# Patient Record
Sex: MALE | Race: WHITE | Employment: FULL TIME | URBAN - METROPOLITAN AREA
[De-identification: names, ages, dates, MRNs, and addresses within clinical notes are randomized per-mention and may not be internally consistent; named-entity substitution may affect disease eponyms.]

---

## 2017-01-27 ENCOUNTER — OFFICE VISIT (OUTPATIENT)
Dept: INTERNAL MEDICINE CLINIC | Age: 32
End: 2017-01-27

## 2017-01-27 VITALS
WEIGHT: 156.6 LBS | SYSTOLIC BLOOD PRESSURE: 124 MMHG | HEART RATE: 97 BPM | HEIGHT: 64 IN | DIASTOLIC BLOOD PRESSURE: 60 MMHG | BODY MASS INDEX: 26.73 KG/M2 | OXYGEN SATURATION: 99 % | RESPIRATION RATE: 16 BRPM | TEMPERATURE: 98 F

## 2017-01-27 DIAGNOSIS — K40.91 UNILATERAL RECURRENT INGUINAL HERNIA WITHOUT OBSTRUCTION OR GANGRENE: ICD-10-CM

## 2017-01-27 DIAGNOSIS — L21.0 PITYRIASIS: Primary | ICD-10-CM

## 2017-01-27 RX ORDER — KETOCONAZOLE 20 MG/G
CREAM TOPICAL DAILY
Qty: 30 G | Refills: 0 | Status: SHIPPED | OUTPATIENT
Start: 2017-01-27 | End: 2017-06-13 | Stop reason: ALTCHOICE

## 2017-01-27 NOTE — PATIENT INSTRUCTIONS
Pityriasis Rosea: Care Instructions  Your Care Instructions  Pityriasis rosea is a common skin rash. It usually starts as one scaly, reddish-pink spot on your stomach or back. Days or weeks later, more spots appear. The rash may itch, but it will not spread to other people. No one knows what causes pityriasis rosea. Some doctors believe it is a reaction to a virus. Pityriasis rosea is most common in children and young adults. It lasts 1 to 3 months and then goes away on its own. Medicine can help relieve any itching. Follow-up care is a key part of your treatment and safety. Be sure to make and go to all appointments, and call your doctor if you are having problems. It's also a good idea to know your test results and keep a list of the medicines you take. How can you care for yourself at home? · Use your medicine exactly as prescribed. Call your doctor if you have any problems with your medicine. · Expose your skin to small amounts of sunlight, but avoid sunburn. Sunlight can lessen the rash. · Use a mild soap, such as Dove or Cetaphil, when you wash your skin. · Add a handful of oatmeal (ground to a powder) to your bath. Or you can try an oatmeal bath product, such as Aveeno. Keep the water warm or lukewarm. A hot bath or shower may make the rash more visible and itchy. · Use an over-the-counter 1% hydrocortisone cream for small itchy areas. When should you call for help? Call your doctor now or seek immediate medical care if:  · Your whole body itches, but you have no obvious rash or other cause. · Your itching is so bad that you cannot sleep. · Your home treatment does not help. · Your skin is badly broken from scratching. · You have signs of infection such as:  ¨ Pain, warmth, or swelling near the rash. ¨ Red streaks near the rash. ¨ Pus coming from the rash. ¨ A fever. · You see the rash on the palms of your hands or the soles of your feet.   Watch closely for changes in your health, and be sure to contact your doctor if:  · You do not get better as expected. Where can you learn more? Go to http://martínez-ilda.info/. Enter S327 in the search box to learn more about \"Pityriasis Rosea: Care Instructions. \"  Current as of: February 5, 2016  Content Version: 11.1  © 2006-2016 [employer]. Care instructions adapted under license by [employer] (which disclaims liability or warranty for this information). If you have questions about a medical condition or this instruction, always ask your healthcare professional. [address] any warranty or liability for your use of this information. Hernia: Care Instructions  Your Care Instructions    A hernia develops when tissue bulges through a weak spot in the wall of your belly. The groin area and the navel are common areas for a hernia. A hernia can also develop near the area of a surgery you had before. Pressure from lifting, straining, or coughing can tear the weak area, causing the hernia to bulge and be painful. If you cannot push a hernia back into place, the tissue may become trapped outside the belly wall. If the hernia gets twisted and loses its blood supply, it will swell and die. This is called a strangulated hernia. It usually causes a lot of pain. It needs treatment right away. Some hernias need to be repaired to prevent a strangulated hernia. If your hernia causes symptoms or is large, you may need surgery. Follow-up care is a key part of your treatment and safety. Be sure to make and go to all appointments, and call your doctor if you are having problems. Its also a good idea to know your test results and keep a list of the medicines you take. How can you care for yourself at home? · Take care when lifting heavy objects. · Stay at a healthy weight. · Do not smoke. Smoking can cause coughing, which can cause your hernia to bulge.  If you need help quitting, talk to your doctor about stop-smoking programs and medicines. These can increase your chances of quitting for good. · Talk with your doctor before wearing a corset or truss for a hernia. These devices are not recommended for treating hernias and sometimes can do more harm than good. There may be certain situations when your doctor thinks a truss would work, but these are rare. When should you call for help? Call your doctor now or seek immediate medical care if:  · You have severe belly pain. · You have nausea or vomiting. · You have belly pain and are not passing gas or stool. · You cannot push the hernia back into place with gentle pressure when you are lying down. · The skin over the hernia turns red or becomes tender. Watch closely for changes in your health, and be sure to contact your doctor if:  · You have new or increased pain. · You do not get better as expected. Where can you learn more? Go to http://martínez-ilda.info/. Enter C129 in the search box to learn more about \"Hernia: Care Instructions. \"  Current as of: August 9, 2016  Content Version: 11.1  © 1853-3354 [employer]. Care instructions adapted under license by [employer] (which disclaims liability or warranty for this information). If you have questions about a medical condition or this instruction, always ask your healthcare professional. [address] any warranty or liability for your use of this information.

## 2017-01-27 NOTE — PROGRESS NOTES
Misael Santana is a 27 y.o. male who was seen in clinic today (1/27/2017). He was last seen about 2 years. Assessment & Plan:  Oxana Wiseman was seen today for skin problem and groin swelling. Diagnoses and all orders for this visit:    Pityriasis- this is a chronic problem but new to me, differential dx reviewed with the patient, looks fungal for the most part but not entirely sure. Will treat with meds below. If no changes then to dermatology. Red flags were reviewed with the patient to RTC or notify me, expected time course for resolution reviewed. -     ketoconazole (NIZORAL) 2 % topical cream; Apply  to affected area daily. Unilateral recurrent inguinal hernia without obstruction or gangrene- differential dx reviewed with the patient, sounds classic. Asymptomatic. Not effecting his life. Likely more noticeable due to weight gain. Will treat monitor for now. Red flags were reviewed with the patient to RTC or notify me, did review f/u with surgeon if no changes or gets worse. Follow-up Disposition:  Return in about 6 months (around 7/27/2017) for FULL PHYSICAL - 30 minutes. ----------------------------------------------------------------------    Subjective:  Dermatology Review  He is here to talk about rash. He noticed it 1 month ago, with no changes since that time. Location: back. Symptoms include erythema and itching. He denies: recent travel, new medications, changed in soaps/detergents, change in diet, new pets. Treatment to date has included steroids. He was seen at [facility], treated w/ prednisone, and had no effect. GI Review  Patient complains of mass in his L groin. This has been present for a few weeks ago, unchanged since that time. His symptoms include a mass. It is changing in sides. No h/o trauma. He denies any fevers or chills. He is working out regularly w/o any issues.            Prior to Admission medications    Not on File          Allergies Allergen Reactions    Bactrim [Sulfamethoxazole-Trimethoprim] Hives           Review of Systems   Constitutional: Positive for weight loss. Negative for malaise/fatigue. Respiratory: Negative for cough and shortness of breath. Cardiovascular: Negative for chest pain and palpitations. Gastrointestinal: Positive for abdominal pain. Negative for constipation, diarrhea, nausea and vomiting. Musculoskeletal: Negative for joint pain and myalgias. Skin: Positive for rash. Negative for itching. Objective:   Physical Exam   Cardiovascular: Regular rhythm and normal heart sounds. No murmur heard. Pulmonary/Chest: Effort normal and breath sounds normal. He has no wheezes. He has no rales. Abdominal: Soft. Bowel sounds are normal. He exhibits mass. There is no hepatosplenomegaly. There is no tenderness. Lymphadenopathy:     He has no cervical adenopathy. Skin:   Upper back, bilat shoulders, and upper chest there is scattered erythematous papules. On the whole back there is a lacy faint erythematous flat rash          Visit Vitals    /60 (BP 1 Location: Right arm, BP Patient Position: Sitting)    Pulse 97    Temp 98 °F (36.7 °C) (Oral)    Resp 16    Ht 5' 4\" (1.626 m)    Wt 156 lb 9.6 oz (71 kg)    SpO2 99%    BMI 26.88 kg/m2         Disclaimer:  Advised him to call back or return to office if symptoms worsen/change/persist.  Discussed expected course/resolution/complications of diagnosis in detail with patient. Medication risks/benefits/costs/interactions/alternatives discussed with patient. He was given an after visit summary which includes diagnoses, current medications, & vitals. He expressed understanding with the diagnosis and plan.         Gio Denney MD

## 2017-01-27 NOTE — MR AVS SNAPSHOT
Visit Information Date & Time Provider Department Dept. Phone Encounter #  
 1/27/2017  8:15 AM Prince Silverman, [facility] [phone] [id number] Follow-up Instructions Return in about 6 months (around 7/27/2017) for FULL PHYSICAL - 30 minutes. Upcoming Health Maintenance Date Due INFLUENZA AGE 9 TO ADULT 8/1/2016 DTaP/Tdap/Td series (2 - Td) 11/3/2024 Allergies as of 1/27/2017  Review Complete On: 1/27/2017 By: Prince Herrera MD  
  
 Severity Noted Reaction Type Reactions Bactrim [Sulfamethoxazole-trimethoprim]  06/26/2013    Hives Current Immunizations  Reviewed on 1/27/2017 Name Date Influenza Vaccine 11/1/2016, 11/1/2013 Tdap 11/3/2014 Reviewed by Prince Herrera MD on 1/27/2017 at  8:50 AM  
You Were Diagnosed With   
  
 Codes Comments Pityriasis    -  Primary ICD-10-CM: L21.0 ICD-9-CM: 696.5 Unilateral recurrent inguinal hernia without obstruction or gangrene     ICD-10-CM: K40.91 
ICD-9-CM: 550.91 Vitals BP Pulse Temp Resp Height(growth percentile) Weight(growth percentile) 124/60 (BP 1 Location: Right arm, BP Patient Position: Sitting) 97 98 °F (36.7 °C) (Oral) 16 5' 4\" (1.626 m) 156 lb 9.6 oz (71 kg) SpO2 BMI Smoking Status 99% 26.88 kg/m2 Never Smoker BMI and BSA Data Body Mass Index Body Surface Area  
 26.88 kg/m 2 1.79 m 2 Preferred Pharmacy Pharmacy Name Phone [id number]- [address], [address] [phone] Your Updated Medication List  
  
Notice  As of 1/27/2017  8:52 AM  
 You have not been prescribed any medications. Follow-up Instructions Return in about 6 months (around 7/27/2017) for FULL PHYSICAL - 30 minutes. Patient Instructions Pityriasis Rosea: Care Instructions Your Care Instructions Pityriasis rosea is a common skin rash. It usually starts as one scaly, reddish-pink spot on your stomach or back. Days or weeks later, more spots appear. The rash may itch, but it will not spread to other people. No one knows what causes pityriasis rosea. Some doctors believe it is a reaction to a virus. Pityriasis rosea is most common in children and young adults. It lasts 1 to 3 months and then goes away on its own. Medicine can help relieve any itching. Follow-up care is a key part of your treatment and safety. Be sure to make and go to all appointments, and call your doctor if you are having problems. It's also a good idea to know your test results and keep a list of the medicines you take. How can you care for yourself at home? · Use your medicine exactly as prescribed. Call your doctor if you have any problems with your medicine. · Expose your skin to small amounts of sunlight, but avoid sunburn. Sunlight can lessen the rash. · Use a mild soap, such as Dove or Cetaphil, when you wash your skin. · Add a handful of oatmeal (ground to a powder) to your bath. Or you can try an oatmeal bath product, such as Aveeno. Keep the water warm or lukewarm. A hot bath or shower may make the rash more visible and itchy. · Use an over-the-counter 1% hydrocortisone cream for small itchy areas. When should you call for help? Call your doctor now or seek immediate medical care if: 
· Your whole body itches, but you have no obvious rash or other cause. · Your itching is so bad that you cannot sleep. · Your home treatment does not help. · Your skin is badly broken from scratching. · You have signs of infection such as: 
¨ Pain, warmth, or swelling near the rash. ¨ Red streaks near the rash. ¨ Pus coming from the rash. ¨ A fever. · You see the rash on the palms of your hands or the soles of your feet. Watch closely for changes in your health, and be sure to contact your doctor if: · You do not get better as expected. Where can you learn more? Go to http://martínez-ilda.info/. Enter S327 in the search box to learn more about \"Pityriasis Rosea: Care Instructions. \" Current as of: February 5, 2016 Content Version: 11.1 © 2803-7166 [employer]. Care instructions adapted under license by [employer] (which disclaims liability or warranty for this information). If you have questions about a medical condition or this instruction, always ask your healthcare professional. [address] any warranty or liability for your use of this information. Hernia: Care Instructions Your Care Instructions A hernia develops when tissue bulges through a weak spot in the wall of your belly. The groin area and the navel are common areas for a hernia. A hernia can also develop near the area of a surgery you had before. Pressure from lifting, straining, or coughing can tear the weak area, causing the hernia to bulge and be painful. If you cannot push a hernia back into place, the tissue may become trapped outside the belly wall. If the hernia gets twisted and loses its blood supply, it will swell and die. This is called a strangulated hernia. It usually causes a lot of pain. It needs treatment right away. Some hernias need to be repaired to prevent a strangulated hernia. If your hernia causes symptoms or is large, you may need surgery. Follow-up care is a key part of your treatment and safety. Be sure to make and go to all appointments, and call your doctor if you are having problems. Its also a good idea to know your test results and keep a list of the medicines you take. How can you care for yourself at home? · Take care when lifting heavy objects. · Stay at a healthy weight. · Do not smoke. Smoking can cause coughing, which can cause your hernia to bulge.  If you need help quitting, talk to your doctor about stop-smoking programs and medicines. These can increase your chances of quitting for good. · Talk with your doctor before wearing a corset or truss for a hernia. These devices are not recommended for treating hernias and sometimes can do more harm than good. There may be certain situations when your doctor thinks a truss would work, but these are rare. When should you call for help? Call your doctor now or seek immediate medical care if: 
· You have severe belly pain. · You have nausea or vomiting. · You have belly pain and are not passing gas or stool. · You cannot push the hernia back into place with gentle pressure when you are lying down. · The skin over the hernia turns red or becomes tender. Watch closely for changes in your health, and be sure to contact your doctor if: 
· You have new or increased pain. · You do not get better as expected. Where can you learn more? Go to http://martínez-ilda.info/. Enter C129 in the search box to learn more about \"Hernia: Care Instructions. \" Current as of: August 9, 2016 Content Version: 11.1 © 8334-3426 [employer]. Care instructions adapted under license by [employer] (which disclaims liability or warranty for this information). If you have questions about a medical condition or this instruction, always ask your healthcare professional. [address] any warranty or liability for your use of this information. Introducing [facility]! Ethel Davis introduces [employer] patient portal. Now you can access parts of your medical record, email your doctor's office, and request medication refills online. 1. In your internet browser, go to [email or web address] 2. Click on the First Time User? Click Here link in the Sign In box. You will see the New Member Sign Up page. 3. Enter your [employer] Access Code exactly as it appears below.  You will not need to use this code after youve completed the sign-up process. If you do not sign up before the expiration date, you must request a new code. · [employer] Access Code: [email or web address] Expires: 4/27/2017  8:52 AM 
 
4. Enter the last four digits of your Social Security Number (xxxx) and Date of Birth (mm/dd/yyyy) as indicated and click Submit. You will be taken to the next sign-up page. 5. Create a [employer] ID. This will be your [employer] login ID and cannot be changed, so think of one that is secure and easy to remember. 6. Create a [employer] password. You can change your password at any time. 7. Enter your Password Reset Question and Answer. This can be used at a later time if you forget your password. 8. Enter your e-mail address. You will receive e-mail notification when new information is available in [address] 9. Click Sign Up. You can now view and download portions of your medical record. 10. Click the Download Summary menu link to download a portable copy of your medical information. If you have questions, please visit the Frequently Asked Questions section of the [employer] website. Remember, [employer] is NOT to be used for urgent needs. For medical emergencies, dial 911. Now available from your iPhone and Android! Please provide this summary of care documentation to your next provider. Your primary care clinician is listed as Graciela Spaulding. If you have any questions after today's visit, please call [phone].

## 2017-02-13 ENCOUNTER — TELEPHONE (OUTPATIENT)
Dept: INTERNAL MEDICINE CLINIC | Age: 32
End: 2017-02-13

## 2017-02-13 RX ORDER — TRIAMCINOLONE ACETONIDE 1 MG/G
CREAM TOPICAL 2 TIMES DAILY
Qty: 15 G | Refills: 0 | Status: SHIPPED | OUTPATIENT
Start: 2017-02-13 | End: 2017-06-13 | Stop reason: ALTCHOICE

## 2017-02-13 NOTE — TELEPHONE ENCOUNTER
----- Message from Tanesha Handley LPN sent at 2/45/7752  1:40 PM EST -----  Regarding: FW: cph          rash       ----- Message -----     From: Travis Calderon     Sent: 2/13/2017   7:38 AM       To: Yaneli Zamora Team Two Pool  Subject: cph          rash                                Karime Monique  Male, 27 y.o., 1985  Weight:   156 lb 9.6 oz (71 kg)  Phone:   [phone]; [phone]  PCP:   Sandra Yeung MD  MRN:   [id number]  MyChart:   Pending  Next Appt:   06/13/2017    Dr Chirinos/telephone  Received:  Today      Sweetie Wilkinson Gaby Front Office Pool                 Pt (p) [phone], pt said he was seen on Jan 27,2017 for a rash and was prescribed Ketoconazole    And  Was told that if it did not clear up to call back , he said it did not spread but it did not clear up also, so he would like to know what will be the next step.

## 2017-06-13 ENCOUNTER — OFFICE VISIT (OUTPATIENT)
Dept: INTERNAL MEDICINE CLINIC | Age: 32
End: 2017-06-13

## 2017-06-13 VITALS
SYSTOLIC BLOOD PRESSURE: 110 MMHG | WEIGHT: 163.6 LBS | BODY MASS INDEX: 27.26 KG/M2 | DIASTOLIC BLOOD PRESSURE: 76 MMHG | TEMPERATURE: 98.1 F | HEIGHT: 65 IN | OXYGEN SATURATION: 98 % | RESPIRATION RATE: 12 BRPM | HEART RATE: 82 BPM

## 2017-06-13 DIAGNOSIS — Z00.00 ROUTINE PHYSICAL EXAMINATION: Primary | ICD-10-CM

## 2017-06-13 DIAGNOSIS — E66.3 OVERWEIGHT (BMI 25.0-29.9): ICD-10-CM

## 2017-06-13 NOTE — PROGRESS NOTES
Xuan Hager is a 27 y.o. male who was seen in clinic today (6/13/2017) for a physical.  Patient was seen with Dr Abram Ceron (R2 at [facility]). Assessment & Plan:   Diagnoses and all orders for this visit:    1. Routine physical examination  -     METABOLIC PANEL, COMPREHENSIVE  -     CBC W/O DIFF  -     LIPID PANEL    2. Overweight (BMI 25.0-29.9)- fluctuating, I have reviewed/discussed the above normal BMI with the patient. I have recommended the following interventions: encourage exercise and lifestyle education regarding diet. He knows what he needs to do, reviewed ways to stay in good habits. Follow-up Disposition:  Return in about 2 years (around 6/13/2019) for FULL PHYSICAL - 30 minutes. ------------------------------------------------------------------------------------------    Subjective:   Ruby Khan is here today for a full physical.      Health Maintenance  Immunizations:    Influenza: up to date. Tetanus: up to date. Cancer screening:    Reviewed testicular, prostate, and colon cancer screening guidelines. Patient Care Team:  Estrella Kidd MD as PCP - General (Internal Medicine)       The following sections were reviewed & updated as appropriate: PMH, PSH, FH, and SH. Patient Active Problem List   Diagnosis Code    Diverticulitis K57.92          Prior to Admission medications    Not on File          Allergies   Allergen Reactions    Bactrim [Sulfamethoxazole-Trimethoprim] Hives            Review of Systems   Constitutional: Negative for chills and fever. Respiratory: Negative for cough and shortness of breath. Cardiovascular: Negative for chest pain and palpitations. Gastrointestinal: Negative for abdominal pain, blood in stool, constipation, diarrhea, heartburn, nausea and vomiting. Hernia is fluctuating, little more prevalent w/ weight gain. No pain   Musculoskeletal: Negative for joint pain and myalgias.    Neurological: Negative for tingling, focal weakness and headaches. Endo/Heme/Allergies: Does not bruise/bleed easily. Psychiatric/Behavioral: Negative for depression. The patient is not nervous/anxious and does not have insomnia. Objective:   Physical Exam   Constitutional: He appears well-developed. No distress. HENT:   Right Ear: Tympanic membrane, external ear and ear canal normal.   Left Ear: Tympanic membrane, external ear and ear canal normal.   Nose: Nose normal.   Mouth/Throat: Uvula is midline, oropharynx is clear and moist and mucous membranes are normal. No posterior oropharyngeal erythema. Eyes: Conjunctivae and lids are normal. No scleral icterus. Neck: Neck supple. Carotid bruit is not present. No thyromegaly present. Cardiovascular: Regular rhythm and normal heart sounds. No murmur heard. Pulses:       Dorsalis pedis pulses are 2+ on the right side, and 2+ on the left side. Posterior tibial pulses are 2+ on the right side, and 2+ on the left side. Pulmonary/Chest: Effort normal and breath sounds normal. He has no wheezes. He has no rales. Abdominal: Soft. Bowel sounds are normal. He exhibits no mass. There is no hepatosplenomegaly. There is no tenderness. A hernia is present. Hernia confirmed positive in the left inguinal area (4cm, easily reducible). Musculoskeletal: He exhibits no edema. Cervical back: Normal.        Thoracic back: He exhibits no bony tenderness. Lumbar back: Normal.   Lymphadenopathy:     He has no cervical adenopathy. Neurological: He has normal strength. No sensory deficit. Skin: No rash noted. Psychiatric: He has a normal mood and affect.  His behavior is normal.          Visit Vitals    /76    Pulse 82    Temp 98.1 °F (36.7 °C) (Oral)    Resp 12    Ht 5' 4.6\" (1.641 m)    Wt 163 lb 9.6 oz (74.2 kg)    SpO2 98%    BMI 27.56 kg/m2          Advised him to call back or return to office if symptoms worsen/change/persist.  Discussed expected course/resolution/complications of diagnosis in detail with patient. Medication risks/benefits/costs/interactions/alternatives discussed with patient. He was given an after visit summary which includes diagnoses, current medications, & vitals. He expressed understanding with the diagnosis and plan.         Opal Landis MD

## 2017-06-13 NOTE — MR AVS SNAPSHOT
Visit Information Date & Time Provider Department Dept. Phone Encounter #  
 6/13/2017  9:30 AM [address] [place], [address] [facility] Internal Medicine [phone] [id number] Follow-up Instructions Return in about 2 years (around 6/13/2019) for FULL PHYSICAL - 30 minutes. Upcoming Health Maintenance Date Due INFLUENZA AGE 9 TO ADULT 8/1/2017 DTaP/Tdap/Td series (2 - Td) 11/3/2024 Allergies as of 6/13/2017  Review Complete On: 6/13/2017 By: Rd Newton MD  
  
 Severity Noted Reaction Type Reactions Bactrim [Sulfamethoxazole-trimethoprim]  06/26/2013    Hives Current Immunizations  Reviewed on 6/13/2017 Name Date Influenza Vaccine 11/1/2016, 11/1/2013 Tdap 11/3/2014 Reviewed by Danya Person RN on 6/13/2017 at  9:35 AM  
You Were Diagnosed With   
  
 Codes Comments Routine physical examination    -  Primary ICD-10-CM: Z00.00 ICD-9-CM: V70.0 Vitals BP Pulse Temp Resp Height(growth percentile) Weight(growth percentile) 110/76 82 98.1 °F (36.7 °C) (Oral) 12 5' 4.6\" (1.641 m) 163 lb 9.6 oz (74.2 kg) SpO2 BMI Smoking Status 98% 27.56 kg/m2 Never Smoker Vitals History BMI and BSA Data Body Mass Index Body Surface Area  
 27.56 kg/m 2 1.84 m 2 Preferred Pharmacy Pharmacy Name Phone [facility] #[id number]- [facility], [address] [place] [address] [phone] Your Updated Medication List  
  
Notice  As of 6/13/2017  9:54 AM  
 You have not been prescribed any medications. We Performed the Following CBC W/O DIFF [88033 CPT(R)] LIPID PANEL [98954 CPT(R)] METABOLIC PANEL, COMPREHENSIVE [50487 CPT(R)] Follow-up Instructions Return in about 2 years (around 6/13/2019) for FULL PHYSICAL - 30 minutes. Patient Instructions Well Visit, Ages 25 to 48: Care Instructions Your Care Instructions Physical exams can help you stay healthy. Your doctor has checked your overall health and may have suggested ways to take good care of yourself. He or she also may have recommended tests. At home, you can help prevent illness with healthy eating, regular exercise, and other steps. Follow-up care is a key part of your treatment and safety. Be sure to make and go to all appointments, and call your doctor if you are having problems. It's also a good idea to know your test results and keep a list of the medicines you take. How can you care for yourself at home? · Reach and stay at a healthy weight. This will lower your risk for many problems, such as obesity, diabetes, heart disease, and high blood pressure. · Get at least 30 minutes of physical activity on most days of the week. Walking is a good choice. You also may want to do other activities, such as running, swimming, cycling, or playing tennis or team sports. Discuss any changes in your exercise program with your doctor. · Do not smoke or allow others to smoke around you. If you need help quitting, talk to your doctor about stop-smoking programs and medicines. These can increase your chances of quitting for good. · Talk to your doctor about whether you have any risk factors for sexually transmitted infections (STIs). Having one sex partner (who does not have STIs and does not have sex with anyone else) is a good way to avoid these infections. · Use birth control if you do not want to have children at this time. Talk with your doctor about the choices available and what might be best for you. · Protect your skin from too much sun. When you're outdoors from 10 a.m. to 4 p.m., stay in the shade or cover up with clothing and a hat with a wide brim. Wear sunglasses that block UV rays. Even when it's cloudy, put broad-spectrum sunscreen (SPF 30 or higher) on any exposed skin. · See a dentist one or two times a year for checkups and to have your teeth cleaned. · Wear a seat belt in the car. · Drink alcohol in moderation, if at all. That means no more than 2 drinks a day for men and 1 drink a day for women. Follow your doctor's advice about when to have certain tests. These tests can spot problems early. For everyone · Cholesterol. Have the fat (cholesterol) in your blood tested after age 21. Your doctor will tell you how often to have this done based on your age, family history, or other things that can increase your risk for heart disease. · Blood pressure. Have your blood pressure checked during a routine doctor visit. Your doctor will tell you how often to check your blood pressure based on your age, your blood pressure results, and other factors. · Vision. Talk with your doctor about how often to have a glaucoma test. 
· Diabetes. Ask your doctor whether you should have tests for diabetes. · Colon cancer. Have a test for colon cancer at age 48. You may have one of several tests. If you are younger than 48, you may need a test earlier if you have any risk factors. Risk factors include whether you already had a precancerous polyp removed from your colon or whether your parent, brother, sister, or child has had colon cancer. For women · Breast exam and mammogram. Talk to your doctor about when you should have a clinical breast exam and a mammogram. Medical experts differ on whether and how often women under 50 should have these tests. Your doctor can help you decide what is right for you. · Pap test and pelvic exam. Begin Pap tests at age 24. A Pap test is the best way to find cervical cancer. The test often is part of a pelvic exam. Ask how often to have this test. 
· Tests for sexually transmitted infections (STIs). Ask whether you should have tests for STIs. You may be at risk if you have sex with more than one person, especially if your partners do not wear condoms. For men · Tests for sexually transmitted infections (STIs).  Ask whether you should have tests for STIs. You may be at risk if you have sex with more than one person, especially if you do not wear a condom. · Testicular cancer exam. Ask your doctor whether you should check your testicles regularly. · Prostate exam. Talk to your doctor about whether you should have a blood test (called a PSA test) for prostate cancer. Experts differ on whether and when men should have this test. Some experts suggest it if you are older than 39 and are -American or have a father or brother who got prostate cancer when he was younger than 72. When should you call for help? Watch closely for changes in your health, and be sure to contact your doctor if you have any problems or symptoms that concern you. Where can you learn more? Go to http://martínez-ilda.info/. Enter P072 in the search box to learn more about \"Well Visit, Ages 25 to 48: Care Instructions. \" Current as of: July 19, 2016 Content Version: 11.2 © 8136-2495 [employer]. Care instructions adapted under license by [employer] (which disclaims liability or warranty for this information). If you have questions about a medical condition or this instruction, always ask your healthcare professional. [place] [address] any warranty or liability for your use of this information. Introducing hospitals & HEALTH SERVICES! Torie Soto introduces [employer] patient portal. Now you can access parts of your medical record, email your doctor's office, and request medication refills online. 1. In your internet browser, go to [email or web address] 2. Click on the First Time User? Click Here link in the Sign In box. You will see the New Member Sign Up page. 3. Enter your [employer] Access Code exactly as it appears below. You will not need to use this code after youve completed the sign-up process. If you do not sign up before the expiration date, you must request a new code. · [employer] Access Code: [email or web address] Expires: 9/11/2017  9:04 AM 
 
4. Enter the last four digits of your Social Security Number (xxxx) and Date of Birth (mm/dd/yyyy) as indicated and click Submit. You will be taken to the next sign-up page. 5. Create a [employer] ID. This will be your [employer] login ID and cannot be changed, so think of one that is secure and easy to remember. 6. Create a [employer] password. You can change your password at any time. 7. Enter your Password Reset Question and Answer. This can be used at a later time if you forget your password. 8. Enter your e-mail address. You will receive e-mail notification when new information is available in [address] 9. Click Sign Up. You can now view and download portions of your medical record. 10. Click the Download Summary menu link to download a portable copy of your medical information. If you have questions, please visit the Frequently Asked Questions section of the [employer] website. Remember, [employer] is NOT to be used for urgent needs. For medical emergencies, dial 911. Now available from your iPhone and Android! Please provide this summary of care documentation to your next provider. Your primary care clinician is listed as Scar Malagon. If you have any questions after today's visit, please call [phone].

## 2017-06-14 LAB
ALBUMIN SERPL-MCNC: 5.1 G/DL (ref 3.5–5.5)
ALBUMIN/GLOB SERPL: 2.4 {RATIO} (ref 1.2–2.2)
ALP SERPL-CCNC: 99 IU/L (ref 39–117)
ALT SERPL-CCNC: 21 IU/L (ref 0–44)
AST SERPL-CCNC: 23 IU/L (ref 0–40)
BILIRUB SERPL-MCNC: 0.4 MG/DL (ref 0–1.2)
BUN SERPL-MCNC: 16 MG/DL (ref 6–20)
BUN/CREAT SERPL: 15 (ref 9–20)
CALCIUM SERPL-MCNC: 9.6 MG/DL (ref 8.7–10.2)
CHLORIDE SERPL-SCNC: 98 MMOL/L (ref 96–106)
CHOLEST SERPL-MCNC: 168 MG/DL (ref 100–199)
CO2 SERPL-SCNC: 23 MMOL/L (ref 18–29)
CREAT SERPL-MCNC: 1.1 MG/DL (ref 0.76–1.27)
ERYTHROCYTE [DISTWIDTH] IN BLOOD BY AUTOMATED COUNT: 13.9 % (ref 12.3–15.4)
GLOBULIN SER CALC-MCNC: 2.1 G/DL (ref 1.5–4.5)
GLUCOSE SERPL-MCNC: 90 MG/DL (ref 65–99)
HCT VFR BLD AUTO: 47.3 % (ref 37.5–51)
HDLC SERPL-MCNC: 54 MG/DL
HGB BLD-MCNC: 16.3 G/DL (ref 12.6–17.7)
LDLC SERPL CALC-MCNC: 105 MG/DL (ref 0–99)
MCH RBC QN AUTO: 31.3 PG (ref 26.6–33)
MCHC RBC AUTO-ENTMCNC: 34.5 G/DL (ref 31.5–35.7)
MCV RBC AUTO: 91 FL (ref 79–97)
PLATELET # BLD AUTO: 261 X10E3/UL (ref 150–379)
POTASSIUM SERPL-SCNC: 4.6 MMOL/L (ref 3.5–5.2)
PROT SERPL-MCNC: 7.2 G/DL (ref 6–8.5)
RBC # BLD AUTO: 5.2 X10E6/UL (ref 4.14–5.8)
SODIUM SERPL-SCNC: 140 MMOL/L (ref 134–144)
TRIGL SERPL-MCNC: 43 MG/DL (ref 0–149)
VLDLC SERPL CALC-MCNC: 9 MG/DL (ref 5–40)
WBC # BLD AUTO: 7.5 X10E3/UL (ref 3.4–10.8)

## 2020-03-17 ENCOUNTER — OFFICE VISIT (OUTPATIENT)
Dept: INTERNAL MEDICINE CLINIC | Age: 35
End: 2020-03-17

## 2020-03-17 VITALS
BODY MASS INDEX: 29.16 KG/M2 | DIASTOLIC BLOOD PRESSURE: 72 MMHG | SYSTOLIC BLOOD PRESSURE: 100 MMHG | OXYGEN SATURATION: 99 % | WEIGHT: 175 LBS | RESPIRATION RATE: 16 BRPM | HEART RATE: 78 BPM | HEIGHT: 65 IN | TEMPERATURE: 97.5 F

## 2020-03-17 DIAGNOSIS — Z90.49 HISTORY OF PARTIAL COLECTOMY: ICD-10-CM

## 2020-03-17 DIAGNOSIS — Z00.00 ROUTINE PHYSICAL EXAMINATION: Primary | ICD-10-CM

## 2020-03-17 DIAGNOSIS — E66.3 OVERWEIGHT (BMI 25.0-29.9): ICD-10-CM

## 2020-03-17 DIAGNOSIS — R41.840 CONCENTRATION DEFICIT: ICD-10-CM

## 2020-03-17 RX ORDER — IBUPROFEN 200 MG
200 TABLET ORAL
COMMUNITY

## 2020-03-17 NOTE — PROGRESS NOTES
GEORGETTE.  Returned from [facility] on 03/02/20. May be having knee surgery in the future.

## 2020-03-17 NOTE — PATIENT INSTRUCTIONS
Well Visit, Ages 25 to 48: Care Instructions Your Care Instructions Physical exams can help you stay healthy. Your doctor has checked your overall health and may have suggested ways to take good care of yourself. He or she also may have recommended tests. At home, you can help prevent illness with healthy eating, regular exercise, and other steps. Follow-up care is a key part of your treatment and safety. Be sure to make and go to all appointments, and call your doctor if you are having problems. It's also a good idea to know your test results and keep a list of the medicines you take. How can you care for yourself at home? · Reach and stay at a healthy weight. This will lower your risk for many problems, such as obesity, diabetes, heart disease, and high blood pressure. · Get at least 30 minutes of physical activity on most days of the week. Walking is a good choice. You also may want to do other activities, such as running, swimming, cycling, or playing tennis or team sports. Discuss any changes in your exercise program with your doctor. · Do not smoke or allow others to smoke around you. If you need help quitting, talk to your doctor about stop-smoking programs and medicines. These can increase your chances of quitting for good. · Talk to your doctor about whether you have any risk factors for sexually transmitted infections (STIs). Having one sex partner (who does not have STIs and does not have sex with anyone else) is a good way to avoid these infections. · Use birth control if you do not want to have children at this time. Talk with your doctor about the choices available and what might be best for you. · Protect your skin from too much sun. When you're outdoors from 10 a.m. to 4 p.m., stay in the shade or cover up with clothing and a hat with a wide brim. Wear sunglasses that block UV rays. Even when it's cloudy, put broad-spectrum sunscreen (SPF 30 or higher) on any exposed skin. · See a dentist one or two times a year for checkups and to have your teeth cleaned. · Wear a seat belt in the car. Follow your doctor's advice about when to have certain tests. These tests can spot problems early. For everyone · Cholesterol. Have the fat (cholesterol) in your blood tested after age 21. Your doctor will tell you how often to have this done based on your age, family history, or other things that can increase your risk for heart disease. · Blood pressure. Have your blood pressure checked during a routine doctor visit. Your doctor will tell you how often to check your blood pressure based on your age, your blood pressure results, and other factors. · Vision. Talk with your doctor about how often to have a glaucoma test. 
· Diabetes. Ask your doctor whether you should have tests for diabetes. · Colon cancer. Your risk for colorectal cancer gets higher as you get older. Some experts say that adults should start regular screening at age 48 and stop at age 76. Others say to start before age 48 or continue after age 76. Talk with your doctor about your risk and when to start and stop screening. For women · Breast exam and mammogram. Talk to your doctor about when you should have a clinical breast exam and a mammogram. Medical experts differ on whether and how often women under 50 should have these tests. Your doctor can help you decide what is right for you. · Cervical cancer screening test and pelvic exam. Begin with a Pap test at age 24. The test often is part of a pelvic exam. Starting at age 27, you may choose to have a Pap test, an HPV test, or both tests at the same time (called co-testing). Talk with your doctor about how often to have testing. · Tests for sexually transmitted infections (STIs). Ask whether you should have tests for STIs. You may be at risk if you have sex with more than one person, especially if your partners do not wear condoms. For men · Tests for sexually transmitted infections (STIs). Ask whether you should have tests for STIs. You may be at risk if you have sex with more than one person, especially if you do not wear a condom. · Testicular cancer exam. Ask your doctor whether you should check your testicles regularly. · Prostate exam. Talk to your doctor about whether you should have a blood test (called a PSA test) for prostate cancer. Experts differ on whether and when men should have this test. Some experts suggest it if you are older than 39 and are -American or have a father or brother who got prostate cancer when he was younger than 72. When should you call for help? Watch closely for changes in your health, and be sure to contact your doctor if you have any problems or symptoms that concern you. Where can you learn more? Go to http://martínez-ilda.info/ Enter P072 in the search box to learn more about \"Well Visit, Ages 25 to 48: Care Instructions. \" Current as of: August 21, 2019Content Version: 12.4 © 3751-3401 [employer]. Care instructions adapted under license by [employer] (which disclaims liability or warranty for this information). If you have questions about a medical condition or this instruction, always ask your healthcare professional. [address] any warranty or liability for your use of this information.

## 2020-03-17 NOTE — PROGRESS NOTES
Tomeka Navarrete is a 38 y.o. male who was seen in clinic today (3/17/2020) for a physical.          Assessment & Plan:   Diagnoses and all orders for this visit:    1. Routine physical examination  -     METABOLIC PANEL, COMPREHENSIVE  -     CBC W/O DIFF  -     LIPID PANEL    2. History of partial colectomy    3. Concentration deficit- new dx to me, he reports chronic issue, reviewed pros/cons to testing, reviewed pros/cons to medications if he has ADHD  -     REFERRAL TO PSYCHOLOGY    4. Overweight (BMI 25.0-29. 9)- poorly controlled, worsening, I have recommended the following interventions: encourage exercise and lifestyle education regarding diet. Follow-up and Dispositions    · Return in about 1 year (around 3/17/2021) for FULL PHYSICAL - 30 minutes. ------------------------------------------------------------------------------------------    Subjective:   Ke Torres is here today for a full physical.      Health Maintenance  Immunizations:   Influenza: up to date  Tetanus: up to date     Cancer screening:    Reviewed testicular, prostate, and colon cancer screening guidelines. Patient Care Team:  Erwin Boxer, MD as PCP - General (Internal Medicine)  Erwin Boxer, MD as PCP - [facility] Empaneled Provider       The following sections were reviewed & updated as appropriate: PMH, PSH, FH, and SH. Patient Active Problem List   Diagnosis Code    Diverticulitis K57.92    History of partial colectomy Z90.49          Prior to Admission medications    Medication Sig Start Date End Date Taking? Authorizing Provider   ascorbic acid (VITAMIN C PO) Take  by mouth daily as needed. Yes Provider, Historical   ibuprofen (MOTRIN) 200 mg tablet Take 200 mg by mouth every six (6) hours as needed. Provider, Historical          Allergies   Allergen Reactions    Bactrim [Sulfamethoxazole-Trimethoprim] Hives            Review of Systems   Constitutional: Negative for chills and fever. Respiratory: Negative for cough and shortness of breath. Cardiovascular: Negative for chest pain and palpitations. Gastrointestinal: Negative for abdominal pain, blood in stool, constipation, diarrhea, heartburn, nausea and vomiting. Musculoskeletal: Positive for joint pain (L knee - due for surgery ). Negative for myalgias. Neurological: Negative for tingling, focal weakness and headaches. Endo/Heme/Allergies: Does not bruise/bleed easily. Psychiatric/Behavioral: Negative for depression. The patient is not nervous/anxious and does not have insomnia. Daughter being worked up for ADHD. He reports always being on the go. Trouble sitting still. He reports he has trouble focusing at work         Objective:   Physical Exam  Constitutional:       General: He is not in acute distress. Appearance: He is well-developed. HENT:      Right Ear: Tympanic membrane and ear canal normal.      Left Ear: Tympanic membrane and ear canal normal.      Mouth/Throat:      Mouth: Mucous membranes are moist.      Pharynx: No posterior oropharyngeal erythema. Eyes:      Conjunctiva/sclera: Conjunctivae normal.   Cardiovascular:      Rate and Rhythm: Regular rhythm. Heart sounds: Normal heart sounds. No murmur. Pulmonary:      Effort: Pulmonary effort is normal.      Breath sounds: Normal breath sounds. Abdominal:      General: Bowel sounds are normal.      Palpations: Abdomen is soft. Tenderness: There is no abdominal tenderness. Musculoskeletal:      Right lower leg: No edema. Left lower leg: No edema. Lymphadenopathy:      Cervical: No cervical adenopathy.    Psychiatric:         Mood and Affect: Mood and affect normal.            Visit Vitals  /72   Pulse 78   Temp 97.5 °F (36.4 °C) (Oral)   Resp 16   Ht 5' 4.5\" (1.638 m)   Wt 175 lb (79.4 kg)   SpO2 99%   BMI 29.57 kg/m²          Advised him to call back or return to office if symptoms worsen/change/persist.  Discussed expected course/resolution/complications of diagnosis in detail with patient. Medication risks/benefits/costs/interactions/alternatives discussed with patient. He was given an after visit summary which includes diagnoses, current medications, & vitals. He expressed understanding with the diagnosis and plan. Aspects of this note may have been generated using voice recognition software. Despite editing, there may be some syntax errors.        Wesley Scott MD        .

## 2020-03-18 LAB
ALBUMIN SERPL-MCNC: 4.7 G/DL (ref 4–5)
ALBUMIN/GLOB SERPL: 2 {RATIO} (ref 1.2–2.2)
ALP SERPL-CCNC: 113 IU/L (ref 39–117)
ALT SERPL-CCNC: 34 IU/L (ref 0–44)
AST SERPL-CCNC: 24 IU/L (ref 0–40)
BILIRUB SERPL-MCNC: 0.6 MG/DL (ref 0–1.2)
BUN SERPL-MCNC: 11 MG/DL (ref 6–20)
BUN/CREAT SERPL: 11 (ref 9–20)
CALCIUM SERPL-MCNC: 9.6 MG/DL (ref 8.7–10.2)
CHLORIDE SERPL-SCNC: 100 MMOL/L (ref 96–106)
CHOLEST SERPL-MCNC: 149 MG/DL (ref 100–199)
CO2 SERPL-SCNC: 26 MMOL/L (ref 20–29)
CREAT SERPL-MCNC: 1.03 MG/DL (ref 0.76–1.27)
ERYTHROCYTE [DISTWIDTH] IN BLOOD BY AUTOMATED COUNT: 12.7 % (ref 11.6–15.4)
GLOBULIN SER CALC-MCNC: 2.3 G/DL (ref 1.5–4.5)
GLUCOSE SERPL-MCNC: 81 MG/DL (ref 65–99)
HCT VFR BLD AUTO: 47.9 % (ref 37.5–51)
HDLC SERPL-MCNC: 42 MG/DL
HGB BLD-MCNC: 16.4 G/DL (ref 13–17.7)
LDLC SERPL CALC-MCNC: 85 MG/DL (ref 0–99)
MCH RBC QN AUTO: 30.7 PG (ref 26.6–33)
MCHC RBC AUTO-ENTMCNC: 34.2 G/DL (ref 31.5–35.7)
MCV RBC AUTO: 90 FL (ref 79–97)
PLATELET # BLD AUTO: 312 X10E3/UL (ref 150–450)
POTASSIUM SERPL-SCNC: 4.5 MMOL/L (ref 3.5–5.2)
PROT SERPL-MCNC: 7 G/DL (ref 6–8.5)
RBC # BLD AUTO: 5.35 X10E6/UL (ref 4.14–5.8)
SODIUM SERPL-SCNC: 140 MMOL/L (ref 134–144)
TRIGL SERPL-MCNC: 108 MG/DL (ref 0–149)
VLDLC SERPL CALC-MCNC: 22 MG/DL (ref 5–40)
WBC # BLD AUTO: 7.7 X10E3/UL (ref 3.4–10.8)

## 2020-04-05 ENCOUNTER — TELEPHONE (OUTPATIENT)
Dept: INTERNAL MEDICINE CLINIC | Age: 35
End: 2020-04-05

## 2020-04-06 NOTE — TELEPHONE ENCOUNTER
ON CALL NOTE:  He reports URI symptoms. Mild. No fevers or SOB. Similar to previous allergies but having increase in sinus congestion and pressure. Reviewed OTC meds. He will notify me if it gets worse and will consider antibiotics.

## 2022-03-19 PROBLEM — Z90.49 HISTORY OF PARTIAL COLECTOMY: Status: ACTIVE | Noted: 2020-03-17

## 2023-05-10 RX ORDER — IBUPROFEN 200 MG
TABLET ORAL EVERY 6 HOURS PRN
COMMUNITY